# Patient Record
Sex: FEMALE | Race: WHITE
[De-identification: names, ages, dates, MRNs, and addresses within clinical notes are randomized per-mention and may not be internally consistent; named-entity substitution may affect disease eponyms.]

---

## 2019-04-10 ENCOUNTER — HOSPITAL ENCOUNTER (EMERGENCY)
Dept: HOSPITAL 5 - ED | Age: 39
Discharge: HOME | End: 2019-04-10
Payer: COMMERCIAL

## 2019-04-10 VITALS — SYSTOLIC BLOOD PRESSURE: 134 MMHG | DIASTOLIC BLOOD PRESSURE: 83 MMHG

## 2019-04-10 DIAGNOSIS — Z53.21: ICD-10-CM

## 2019-04-10 DIAGNOSIS — I10: Primary | ICD-10-CM

## 2019-04-10 LAB
ALBUMIN SERPL-MCNC: 4.3 G/DL (ref 3.9–5)
ALT SERPL-CCNC: 16 UNITS/L (ref 7–56)
BILIRUB UR QL STRIP: (no result)
BLOOD UR QL VISUAL: (no result)
BUN SERPL-MCNC: 11 MG/DL (ref 7–17)
BUN/CREAT SERPL: 16 %
CALCIUM SERPL-MCNC: 9.4 MG/DL (ref 8.4–10.2)
HCT VFR BLD CALC: 43 % (ref 30.3–42.9)
HEMOLYSIS INDEX: 0
HGB BLD-MCNC: 14.9 GM/DL (ref 10.1–14.3)
MCHC RBC AUTO-ENTMCNC: 35 % (ref 30–34)
MCV RBC AUTO: 89 FL (ref 79–97)
MUCOUS THREADS #/AREA URNS HPF: (no result) /HPF
PH UR STRIP: 5 [PH] (ref 5–7)
PLATELET # BLD: 252 K/MM3 (ref 140–440)
PROT UR STRIP-MCNC: (no result) MG/DL
RBC # BLD AUTO: 4.82 M/MM3 (ref 3.65–5.03)
RBC #/AREA URNS HPF: 2 /HPF (ref 0–6)
UROBILINOGEN UR-MCNC: < 2 MG/DL (ref ?–2)
WBC #/AREA URNS HPF: 3 /HPF (ref 0–6)

## 2019-04-10 PROCEDURE — 76705 ECHO EXAM OF ABDOMEN: CPT

## 2019-04-10 PROCEDURE — 71046 X-RAY EXAM CHEST 2 VIEWS: CPT

## 2019-04-10 PROCEDURE — 36415 COLL VENOUS BLD VENIPUNCTURE: CPT

## 2019-04-10 PROCEDURE — 85027 COMPLETE CBC AUTOMATED: CPT

## 2019-04-10 PROCEDURE — 81025 URINE PREGNANCY TEST: CPT

## 2019-04-10 PROCEDURE — 93005 ELECTROCARDIOGRAM TRACING: CPT

## 2019-04-10 PROCEDURE — 80053 COMPREHEN METABOLIC PANEL: CPT

## 2019-04-10 PROCEDURE — 81001 URINALYSIS AUTO W/SCOPE: CPT

## 2019-04-10 PROCEDURE — 84484 ASSAY OF TROPONIN QUANT: CPT

## 2019-04-10 PROCEDURE — 93010 ELECTROCARDIOGRAM REPORT: CPT

## 2019-04-10 PROCEDURE — 96374 THER/PROPH/DIAG INJ IV PUSH: CPT

## 2019-04-10 PROCEDURE — 99284 EMERGENCY DEPT VISIT MOD MDM: CPT

## 2019-04-10 PROCEDURE — 83690 ASSAY OF LIPASE: CPT

## 2019-04-10 NOTE — XRAY REPORT
PROCEDURE: XR CHEST ROUTINE 2V 

 

TECHNIQUE:  2 views of the chest 

 

HISTORY: chest pain and palpitations 

 

COMPARISONS: None  

 

FINDINGS: 

 

Normal heart size. 

 

Lungs are clear and well-expanded without focal infiltrate or consolidation. 

 

No effusion. 

 

Imaged axial skeleton is unremarkable. 

 

IMPRESSION:  

 

No acute cardiopulmonary disease.. 

 

This document is electronically signed by Angela Chua MD., April 10 2019 08:55:28 PM ET

## 2019-04-10 NOTE — ULTRASOUND REPORT
PROCEDURE: US ABDOMEN LIMITED 

 

TECHNIQUE:  Longitudinal and transverse grayscale, color, and Doppler sonographic images were perform
ed 

 

HISTORY: ruq pain with nausea 

 

COMPARISONS: None  

 

FINDINGS: 

 

The pancreas is not well visualized. 

 

Liver demonstrates diffuse increased echogenicity without focal lesion. 

 

Right kidney measures 10.0 cm, mildly echogenic but otherwise sonographically unremarkable. 

 

Inferior vena cava and hepatic veins and aorta are unremarkable. 

 

Gallbladder is anechoic. Wall measures 2 mm. No pericholecystic fluid. 

 

Common duct measures 4 mm. 

 

IMPRESSION:  

 

Mildly fatty liver and slightly echogenic kidneys. 

 

No intrahepatic biliary ductal dilatation. No gallstones.. 

 

This document is electronically signed by Angela Chua MD., April 10 2019 08:24:30 PM ET

## 2020-11-17 ENCOUNTER — HOSPITAL ENCOUNTER (EMERGENCY)
Dept: HOSPITAL 5 - ED | Age: 40
LOS: 1 days | Discharge: HOME | End: 2020-11-18
Payer: SELF-PAY

## 2020-11-17 VITALS — SYSTOLIC BLOOD PRESSURE: 119 MMHG | DIASTOLIC BLOOD PRESSURE: 71 MMHG

## 2020-11-17 DIAGNOSIS — R10.9: ICD-10-CM

## 2020-11-17 DIAGNOSIS — Z3A.14: ICD-10-CM

## 2020-11-17 DIAGNOSIS — O21.8: ICD-10-CM

## 2020-11-17 DIAGNOSIS — O26.891: Primary | ICD-10-CM

## 2020-11-17 DIAGNOSIS — Z79.899: ICD-10-CM

## 2020-11-17 LAB
BACTERIA #/AREA URNS HPF: (no result) /HPF
BASOPHILS # (AUTO): 0 K/MM3 (ref 0–0.1)
BASOPHILS NFR BLD AUTO: 0.3 % (ref 0–1.8)
BILIRUB UR QL STRIP: (no result)
BLOOD UR QL VISUAL: (no result)
EOSINOPHIL # BLD AUTO: 0.1 K/MM3 (ref 0–0.4)
EOSINOPHIL NFR BLD AUTO: 0.5 % (ref 0–4.3)
HCT VFR BLD CALC: 42.2 % (ref 30.3–42.9)
HGB BLD-MCNC: 14.7 GM/DL (ref 10.1–14.3)
LYMPHOCYTES # BLD AUTO: 1.6 K/MM3 (ref 1.2–5.4)
LYMPHOCYTES NFR BLD AUTO: 14 % (ref 13.4–35)
MCHC RBC AUTO-ENTMCNC: 35 % (ref 30–34)
MCV RBC AUTO: 91 FL (ref 79–97)
MONOCYTES # (AUTO): 0.5 K/MM3 (ref 0–0.8)
MONOCYTES % (AUTO): 4.2 % (ref 0–7.3)
MUCOUS THREADS #/AREA URNS HPF: (no result) /HPF
PH UR STRIP: 7 [PH] (ref 5–7)
PLATELET # BLD: 207 K/MM3 (ref 140–440)
PROT UR STRIP-MCNC: (no result) MG/DL
RBC # BLD AUTO: 4.64 M/MM3 (ref 3.65–5.03)
RBC #/AREA URNS HPF: 1 /HPF (ref 0–6)
UROBILINOGEN UR-MCNC: < 2 MG/DL (ref ?–2)
WBC #/AREA URNS HPF: 1 /HPF (ref 0–6)

## 2020-11-17 PROCEDURE — 85025 COMPLETE CBC W/AUTO DIFF WBC: CPT

## 2020-11-17 PROCEDURE — 99284 EMERGENCY DEPT VISIT MOD MDM: CPT

## 2020-11-17 PROCEDURE — 84702 CHORIONIC GONADOTROPIN TEST: CPT

## 2020-11-17 PROCEDURE — 84703 CHORIONIC GONADOTROPIN ASSAY: CPT

## 2020-11-17 PROCEDURE — 96375 TX/PRO/DX INJ NEW DRUG ADDON: CPT

## 2020-11-17 PROCEDURE — 85379 FIBRIN DEGRADATION QUANT: CPT

## 2020-11-17 PROCEDURE — 36415 COLL VENOUS BLD VENIPUNCTURE: CPT

## 2020-11-17 PROCEDURE — 80053 COMPREHEN METABOLIC PANEL: CPT

## 2020-11-17 PROCEDURE — 71046 X-RAY EXAM CHEST 2 VIEWS: CPT

## 2020-11-17 PROCEDURE — 76801 OB US < 14 WKS SINGLE FETUS: CPT

## 2020-11-17 PROCEDURE — 96361 HYDRATE IV INFUSION ADD-ON: CPT

## 2020-11-17 PROCEDURE — 76705 ECHO EXAM OF ABDOMEN: CPT

## 2020-11-17 PROCEDURE — 71275 CT ANGIOGRAPHY CHEST: CPT

## 2020-11-17 PROCEDURE — 96374 THER/PROPH/DIAG INJ IV PUSH: CPT

## 2020-11-17 PROCEDURE — 81001 URINALYSIS AUTO W/SCOPE: CPT

## 2020-11-18 LAB
ALBUMIN SERPL-MCNC: 4.1 G/DL (ref 3.9–5)
ALT SERPL-CCNC: 100 UNITS/L (ref 7–56)
BUN SERPL-MCNC: 9 MG/DL (ref 7–17)
BUN/CREAT SERPL: 15 %
CALCIUM SERPL-MCNC: 9.4 MG/DL (ref 8.4–10.2)
HEMOLYSIS INDEX: 8

## 2020-11-18 NOTE — ULTRASOUND REPORT
ULTRASOUND OBSTETRIC 



INDICATION / CLINICAL INFORMATION:

Pelvic pain.



TECHNIQUE:

Transabdominal.



COMPARISON:

None available.



FINDINGS:

GESTATIONAL SAC: Well-defined oval shape and intrauterine in location. 

YOLK SAC: No significant abnormality.



EMBRYO/FETUS: No significant abnormality. 

- Crown-Rump Length = 1.46 cm = 7.6 weeks.days

- Fetal Heart Rate, beats per minute (if present) = 166



ADNEXA: No significant abnormality.

FREE FLUID: None.



ADDITIONAL FINDINGS: None.



IMPRESSION:

1. Single, living intrauterine pregnancy with estimated sonographic age of 7.6 weeks.days.



Signer Name: Jonathan Shelley MD 

Signed: 11/18/2020 4:09 AM

Workstation Name: [email or web address]

## 2020-11-18 NOTE — CAT SCAN REPORT
CTA CHEST WITH CONTRAST



INDICATION / CLINICAL INFORMATION: RUQ pain w/ elevated d-dimmer.



TECHNIQUE: Axial CT images were obtained through the chest after injection of 100 cc of Omnipaque 350
 IV contrast. 3 plane MIP and/or 3D reconstructions were produced. All CT scans at this location are 
performed using CT dose reduction for ALARA by means of automated exposure control. 



COMPARISON: None available.



FINDINGS:

PULMONARY ARTERIES: No pulmonary emboli.

THORACIC AORTA: No significant abnormality. 

HEART: No significant abnormality.

CORONARY ARTERY CALCIFICATION: None identified

MEDIASTINUM / HELENE: No significant abnormality.

PLEURA: No pleural effusion. No pneumothorax.

LUNGS: 2 



ADDITIONAL FINDINGS: None.



UPPER ABDOMEN: No acute findings.



SKELETAL STRUCTURES: No significant osseous abnormality.



IMPRESSION:

1. No CT evidence for pulmonary embolism. 

2. No acute findings.



Signer Name: Viral Kay MD 

Signed: 11/18/2020 11:09 AM

Workstation Name: [id number]

## 2020-11-18 NOTE — XRAY REPORT
CHEST 2 VIEWS 



INDICATION / CLINICAL INFORMATION:

RUQ pain.



COMPARISON: 

4/10/2019



FINDINGS:



SUPPORT DEVICES: None.

HEART / MEDIASTINUM: No significant abnormality. 

LUNGS / PLEURA: No significant pulmonary or pleural abnormality. No pneumothorax. 



ADDITIONAL FINDINGS: No significant additional findings.



IMPRESSION:

No significant abnormality or interval change from 4/10/2019



Signer Name: Rich Rodgers MD FACR 

Signed: 11/18/2020 8:38 AM

Workstation Name: [id number]

## 2020-11-18 NOTE — EMERGENCY DEPARTMENT REPORT
ED Abdominal Pain HPI





- General


Chief Complaint: Abdominal Pain


Stated Complaint: ABDOMINAL PAIN


Time Seen by Provider: 20 07:25


Source: patient


Mode of arrival: Ambulatory


Limitations: No Limitations





- History of Present Illness


Initial Comments: 





This is a 40-year-old female nontoxic, well nourished in appearance, no acute 

signs of distress presents to the ED with c/o of nausea and right upper 

abdominal pain with radiation to right flank x1 day.   Patient denies any 

vomiting.  Patient stated is 8 weeks pregnant.  Patient stated pain worse with 

deep breathes.  Describes pain as sharp with sudden onset with level of 8/10.  

Patient denies chest pain, short of breath, fever, hemoptysis, blood in stool, 

chills, headache, stiff neck, numbness or tingling.  Patient denies any diarrhea

or constipation.  Denies any blood in stool.  Patient denies any recent travels.

 Denies any vaginal bleeding.  Denies any allergies.





Hannah  has been present during the full physical exam and ED 

stay.


MD Complaint: abdominal pain


-: days(s)


Location: RUQ


Radiation: R flank


Migration to: no migration


Severity: moderate


Severity scale (0 -10): 8


Quality: cramping, aching, sharp


Consistency: constant


Improves With: nothing


Worsens With: nothing


Associated Symptoms: nausea.  denies: vomiting, diarrhea, fever, chills, 

constipation, dysuria, hematemesis, hematochezia, melena, hematuria, anorexia, 

syncope





- Related Data


                                  Previous Rx's











 Medication  Instructions  Recorded  Last Taken  Type


 


amLODIPine 5 mg PO DAILY #30 tab 04/10/19 Unknown Rx











                                    Allergies











Allergy/AdvReac Type Severity Reaction Status Date / Time


 


No Known Allergies Allergy   Verified 04/10/19 16:19














ED Review of Systems


ROS: 


Stated complaint: ABDOMINAL PAIN


Other details as noted in HPI





Comment: All other systems reviewed and negative


Constitutional: denies: chills, fever


Eyes: denies: eye pain, eye discharge, vision change


ENT: denies: ear pain, throat pain


Respiratory: denies: cough, shortness of breath, wheezing


Cardiovascular: denies: chest pain, palpitations


Endocrine: no symptoms reported


Gastrointestinal: abdominal pain, nausea.  denies: vomiting, diarrhea, 

constipation, hematemesis, melena, hematochezia


Genitourinary: denies: urgency, dysuria, frequency, hematuria, discharge, 

abnormal menses, dyspareunia


Musculoskeletal: denies: back pain, joint swelling, arthralgia


Skin: denies: rash, lesions


Neurological: denies: headache, weakness, paresthesias


Psychiatric: denies: anxiety, depression


Hematological/Lymphatic: denies: easy bleeding, easy bruising





ED Past Medical Hx





- Past Medical History


Hx Hypertension: No


Hx Congestive Heart Failure: No


Hx Diabetes: No


Hx Deep Vein Thrombosis: No


Hx Renal Disease: No


Hx Sickle Cell Disease: No


Hx Seizures: No


Hx Asthma: No


Hx COPD: No


Hx HIV: No





- Social History


Smoking Status: Never Smoker


Substance Use Type: None





- Medications


Home Medications: 


                                Home Medications











 Medication  Instructions  Recorded  Confirmed  Last Taken  Type


 


amLODIPine 5 mg PO DAILY #30 tab 04/10/19  Unknown Rx














ED Physical Exam





- General


Limitations: No Limitations


General appearance: alert, in no apparent distress





- Head


Head exam: Present: atraumatic, normocephalic





- Eye


Eye exam: Present: normal appearance





- Neck


Neck exam: Present: normal inspection, full ROM.  Absent: tenderness, 

meningismus, lymphadenopathy





- Respiratory


Respiratory exam: Present: normal lung sounds bilaterally.  Absent: respiratory 

distress, wheezes, rales, rhonchi, chest wall tenderness, accessory muscle use, 

decreased breath sounds, prolonged expiratory





- Cardiovascular


Cardiovascular Exam: Present: regular rate, normal rhythm, normal heart sounds. 

Absent: bradycardia, tachycardia, irregular rhythm, systolic murmur, diastolic 

murmur, rubs, gallop





- GI/Abdominal


GI/Abdominal exam: Present: soft, tenderness (RUQ), normal bowel sounds.  

Absent: distended, guarding, rebound, rigid, diminished bowel sounds





- Extremities Exam


Extremities exam: Present: normal inspection, full ROM





- Back Exam


Back exam: Present: normal inspection, full ROM.  Absent: tenderness, CVA ten

derness (R), CVA tenderness (L), muscle spasm, paraspinal tenderness, vertebral 

tenderness, rash noted





- Neurological Exam


Neurological exam: Present: alert, oriented X3, normal gait





- Psychiatric


Psychiatric exam: Present: normal affect, normal mood





- Skin


Skin exam: Present: warm, dry, intact, normal color.  Absent: rash





ED Course


                                   Vital Signs











  20





  22:08


 


Temperature 97.6 F


 


Pulse Rate 82


 


Respiratory 20





Rate 


 


Blood Pressure 119/71


 


O2 Sat by Pulse 100





Oximetry 














- Reevaluation(s)


Reevaluation #1: 





20 08:41


Patient is speaking in full sentences with no signs of distress noted.





- Consultations


Consultation #1: 





20 08:41


Patient has been consulted with GREG Otoole about patient history, physical 

exam, and labs/imaging studies and agrees to the ED plan of care.


Consultation #2: 





20 09:26


Patient consulted with Dr. Chu of lab results and CT angio ordered.


Consultation #3: 





20 11:59


Patient has been consulted with GREG Otoole about patient history, physical 

exam, and labs/imagining results and patient can be discharged with follow-up.





ED Medical Decision Making





- Lab Data


Result diagrams: 


                                 20 22:32





                                 20 22:32








                                   Lab Results











  20 Range/Units





  22:32 22:32 22:32 


 


WBC   11.4 H   (4.5-11.0)  K/mm3


 


RBC   4.64   (3.65-5.03)  M/mm3


 


Hgb   14.7 H   (10.1-14.3)  gm/dl


 


Hct   42.2   (30.3-42.9)  %


 


MCV   91   (79-97)  fl


 


MCH   32   (28-32)  pg


 


MCHC   35 H   (30-34)  %


 


RDW   13.3   (13.2-15.2)  %


 


Plt Count   207   (140-440)  K/mm3


 


Lymph % (Auto)   14.0   (13.4-35.0)  %


 


Mono % (Auto)   4.2   (0.0-7.3)  %


 


Eos % (Auto)   0.5   (0.0-4.3)  %


 


Baso % (Auto)   0.3   (0.0-1.8)  %


 


Lymph # (Auto)   1.6   (1.2-5.4)  K/mm3


 


Mono # (Auto)   0.5   (0.0-0.8)  K/mm3


 


Eos # (Auto)   0.1   (0.0-0.4)  K/mm3


 


Baso # (Auto)   0.0   (0.0-0.1)  K/mm3


 


Seg Neutrophils %   81.0 H   (40.0-70.0)  %


 


Seg Neutrophils #   9.2 H   (1.8-7.7)  K/mm3


 


D-Dimer     (0-234)  ng/mlDDU


 


Sodium    135 L  (137-145)  mmol/L


 


Potassium    3.6  (3.6-5.0)  mmol/L


 


Chloride    100.2  ()  mmol/L


 


Carbon Dioxide    25  (22-30)  mmol/L


 


Anion Gap    13  mmol/L


 


BUN    9  (7-17)  mg/dL


 


Creatinine    0.6  (0.6-1.2)  mg/dL


 


Estimated GFR    > 60  ml/min


 


BUN/Creatinine Ratio    15  %


 


Glucose    112 H  ()  mg/dL


 


Calcium    9.4  (8.4-10.2)  mg/dL


 


Total Bilirubin    0.70  (0.1-1.2)  mg/dL


 


AST    162 H  (5-40)  units/L


 


ALT    100 H  (7-56)  units/L


 


Alkaline Phosphatase    85  ()  units/L


 


Total Protein    7.0  (6.3-8.2)  g/dL


 


Albumin    4.1  (3.9-5)  g/dL


 


Albumin/Globulin Ratio    1.4  %


 


HCG, Qual     (Negative)  


 


HCG, Quant  69103 H    (0-4)  mIU/mL


 


Urine Color     (Yellow)  


 


Urine Turbidity     (Clear)  


 


Urine pH     (5.0-7.0)  


 


Ur Specific Gravity     (1.003-1.030)  


 


Urine Protein     (Negative)  mg/dL


 


Urine Glucose (UA)     (Negative)  mg/dL


 


Urine Ketones     (Negative)  mg/dL


 


Urine Blood     (Negative)  


 


Urine Nitrite     (Negative)  


 


Urine Bilirubin     (Negative)  


 


Urine Urobilinogen     (<2.0)  mg/dL


 


Ur Leukocyte Esterase     (Negative)  


 


Urine WBC (Auto)     (0.0-6.0)  /HPF


 


Urine RBC (Auto)     (0.0-6.0)  /HPF


 


U Epithel Cells (Auto)     (0-13.0)  /HPF


 


Urine Bacteria (Auto)     (Negative)  /HPF


 


Urine Mucus     /HPF














  20 Range/Units





  22:32 22:51 08:30 


 


WBC     (4.5-11.0)  K/mm3


 


RBC     (3.65-5.03)  M/mm3


 


Hgb     (10.1-14.3)  gm/dl


 


Hct     (30.3-42.9)  %


 


MCV     (79-97)  fl


 


MCH     (28-32)  pg


 


MCHC     (30-34)  %


 


RDW     (13.2-15.2)  %


 


Plt Count     (140-440)  K/mm3


 


Lymph % (Auto)     (13.4-35.0)  %


 


Mono % (Auto)     (0.0-7.3)  %


 


Eos % (Auto)     (0.0-4.3)  %


 


Baso % (Auto)     (0.0-1.8)  %


 


Lymph # (Auto)     (1.2-5.4)  K/mm3


 


Mono # (Auto)     (0.0-0.8)  K/mm3


 


Eos # (Auto)     (0.0-0.4)  K/mm3


 


Baso # (Auto)     (0.0-0.1)  K/mm3


 


Seg Neutrophils %     (40.0-70.0)  %


 


Seg Neutrophils #     (1.8-7.7)  K/mm3


 


D-Dimer    819.12 H  (0-234)  ng/mlDDU


 


Sodium     (137-145)  mmol/L


 


Potassium     (3.6-5.0)  mmol/L


 


Chloride     ()  mmol/L


 


Carbon Dioxide     (22-30)  mmol/L


 


Anion Gap     mmol/L


 


BUN     (7-17)  mg/dL


 


Creatinine     (0.6-1.2)  mg/dL


 


Estimated GFR     ml/min


 


BUN/Creatinine Ratio     %


 


Glucose     ()  mg/dL


 


Calcium     (8.4-10.2)  mg/dL


 


Total Bilirubin     (0.1-1.2)  mg/dL


 


AST     (5-40)  units/L


 


ALT     (7-56)  units/L


 


Alkaline Phosphatase     ()  units/L


 


Total Protein     (6.3-8.2)  g/dL


 


Albumin     (3.9-5)  g/dL


 


Albumin/Globulin Ratio     %


 


HCG, Qual  Positive    (Negative)  


 


HCG, Quant     (0-4)  mIU/mL


 


Urine Color   Yellow   (Yellow)  


 


Urine Turbidity   Clear   (Clear)  


 


Urine pH   7.0   (5.0-7.0)  


 


Ur Specific Gravity   1.014   (1.003-1.030)  


 


Urine Protein   <15 mg/dl   (Negative)  mg/dL


 


Urine Glucose (UA)   Neg   (Negative)  mg/dL


 


Urine Ketones   Tr   (Negative)  mg/dL


 


Urine Blood   Neg   (Negative)  


 


Urine Nitrite   Neg   (Negative)  


 


Urine Bilirubin   Neg   (Negative)  


 


Urine Urobilinogen   < 2.0   (<2.0)  mg/dL


 


Ur Leukocyte Esterase   Neg   (Negative)  


 


Urine WBC (Auto)   1.0   (0.0-6.0)  /HPF


 


Urine RBC (Auto)   1.0   (0.0-6.0)  /HPF


 


U Epithel Cells (Auto)   2.0   (0-13.0)  /HPF


 


Urine Bacteria (Auto)   1+   (Negative)  /HPF


 


Urine Mucus   Few   /HPF














- Radiology Data











Referring Physician: RICH CONNOR


 


Patient Name: DENNIS SCOTT


 


Patient ID: [id number]


 


YOB: 1980


 


Sex: Female


 


Accession: [id number]


 


Report Date: 2020


 


Report Status: Finalized








[facility], [place] [address] [place] [address] Cat

Scan Report Signed Patient: DENNIS SCOTT MR#: [id number] : 

1980 Acct:[id number] Age/Sex: 40 / F ADM Date: 20 Loc: ED 

Attending Dr: Ordering Physician: RICH CONNOR NP Date of Service: 20 

Procedure(s): CT angio chest Accession Number(s): [id number] cc: RICH CONNOR NP 

CTA CHEST WITH CONTRAST INDICATION / CLINICAL INFORMATION: RUQ pain w/ elevated 

d-dimmer. TECHNIQUE: Axial CT images were obtained through the chest after 

injection of 100 cc of Omnipaque 350 IV contrast. 3 plane MIP and/or 3D 

reconstructions were produced. All CT scans at this location are performed using

CT dose reduction for ALARA by means of automated exposure control. COMPARISON: 

None available. FINDINGS: PULMONARY ARTERIES: No pulmonary emboli. THORACIC 

AORTA: No significant abnormality. HEART: No significant abnormality. CORONARY 

ARTERY CALCIFICATION: None identified MEDIASTINUM / HELENE: No significant 

abnormality. PLEURA: No pleural effusion. No pneumothorax. LUNGS: 2 ADDITIONAL 

FINDINGS: None. UPPER ABDOMEN: No acute findings. SKELETAL STRUCTURES: No 

significant osseous abnormality. IMPRESSION: 1. No CT evidence for pulmonary 

embolism. 2. No acute findings. Signer Name: Viral Kay MD Signed: 2020

11:09 AM Workstation Name: [id number] Transcribed By: SS Dictated By: Viral Kay MD Electronically Authenticated By: Viral Kay MD Signed 

Date/Time: 209 DD/DT: 20 TD/TT: 











Referring Physician: RICH CONNOR


 


Patient Name: DENNIS SCOTT


 


Patient ID: [id number]


 


YOB: 1980


 


Sex: Female


 


Accession: [id number]


 


Report Date: 2020


 


Report Status: Finalized








[address] [address] 

XRay Report Signed Patient: DENNIS SCOTT MR#: [id number] : 

1980 Acct:[id number] Age/Sex: 40 / F ADM Date: 20 Loc: ED 

Attending Dr: Ordering Physician: RICH CONNOR NP Date of Service: 20 

Procedure(s): XR chest routine 2V Accession Number(s): [id number] cc: RICH CONNOR NP Fluoro Time In Minutes: CHEST 2 VIEWS INDICATION / CLINICAL 

INFORMATION: RUQ pain. COMPARISON: 4/10/2019 FINDINGS: SUPPORT DEVICES: None. 

HEART / MEDIASTINUM: No significant abnormality. LUNGS / PLEURA: No significant 

pulmonary or pleural abnormality. No pneumothorax. ADDITIONAL FINDINGS: No 

significant additional findings. IMPRESSION: No significant abnormality or 

interval change from 4/10/2019 Signer Name: Rich Rodgers MD FACR Signed: 

2020 8:38 AM Workstation Name: [id number] Transcribed By: MS Dictated By:

Rich Rodgers MD Electronically Authenticated By: Rich Rodgers MD 

Signed Date/Time: 20 DD/DT: 20 TD/TT: 











Referring Physician: DANIELLE WANG


 


Patient Name: DENNIS SCOTT


 


Patient ID: [id number]


 


YOB: 1980


 


Sex: Female


 


Accession: [id number]


 


Report Date: 2020


 


Report Status: Finalized








[address] [address] 

Ultrasound Report Signed Patient: DENNIS SCOTT MR#: [id number] : 

1980 Acct:[id number] Age/Sex: 40 / F ADM Date: 20 Loc: ED 

Attending Dr: Ordering Physician: DANIELLE WANG NP Date of Service: 

20 Procedure(s): US abdomen limited Accession Number(s): [id number] cc: 

DANIELLE WANG NP LIMITED RUQ ABDOMINAL ULTRASOUND INDICATION: RUQ Pain. 

COMPARISON: No relevant prior imaging study available. FINDINGS: Pancreas: 

Visualized portions show no significant abnormality. Abdominal Aorta: No 

significant abnormality. IVC: No significant abnormality. Liver: The liver 

measures 16 cm in length. No significant abnormality. Normal hepatopedal blood 

flow in the main portal vein. Gallbladder: No significant abnormality. Bile 

ducts: No significant abnormality. Common bile duct measures 3.5 mm. Right 

kidney: No significant abnormality visualized.. Free fluid: None. Additional 

Findings: None. IMPRESSION: 1. No acute findings.. Signer Name: Jonathan Shelley MD 

Signed: 2020 6:38 AM Workstation Name: [id number] Transcribed By: YURY 

Dictated By: Jonathan Shelley MD Electronically Authenticated By: Jonathan Shelley MD Signed Date/Time: 20 DD/DT: 20 TD/TT: 











Referring Physician: DOLORES DUMONT


 


Patient Name: DENNIS SCOTT


 


Patient ID: [id number]


 


YOB: 1980


 


Sex: Female


 


Accession: [id number]


 


Report Date: 2020


 


Report Status: Finalized








[place] [address] 

Ultrasound Report Signed Patient: DENNIS SCOTT MR#: [id number] : 

1980 Acct:[id number] Age/Sex: 40 / F ADM Date: 20 Loc: ED 

Attending Dr: Ordering Physician: DOLORES DUMONT MD Date of Service: 20 

Procedure(s): US OB <= 14 weeks fetus Accession Number(s): [id number] cc: DOLORES DUMONT MD ULTRASOUND OBSTETRIC INDICATION / CLINICAL INFORMATION: Pelvic pain. 

TECHNIQUE: Transabdominal. COMPARISON: None available. FINDINGS: GESTATIONAL 

SAC: Well-defined oval shape and intrauterine in location. YOLK SAC: No 

significant abnormality. EMBRYO/FETUS: No significant abnormality. - Crown-Rump 

Length = 1.46 cm = 7.6 weeks.days - Fetal Heart Rate, beats per minute (if 

present) = 166 ADNEXA: No significant abnormality. FREE FLUID: None. ADDITIONAL 

FINDINGS: None. IMPRESSION: 1. Single, living intrauterine pregnancy with 

estimated sonographic age of 7.6 weeks.days. Signer Name: Jonathan Shelley MD 

Signed: 2020 4:09 AM Workstation Name: [id number] Transcribed By: YURY 

Dictated By: Jonathan Shelley MD Electronically Authenticated By: Jonathan Shelley MD Signed Date/Time: 20 DD/DT: 20 TD/TT: 





- Medical Decision Making





This is a 40-year-old female that presents with abdominal pain.  Patient is 

stable and was examined by me. Negative signs of symptoms of appendicitis.  Labs

obtained. UA obtained. Imaging studies obtained and dictated by the radiologist.

Patient is notified of the report with no questions noted by the patient. Vital 

signs are stable prior to discharge.  Patient received medical treatment in the 

ED which patient stated symptoms has resovled and subsided.   present at 

bedside and stated will drive patient home after discharge. A by mouth challenge

has been obtained and patient tolerated well with no nausea vomiting. Patient 

was also instructed to Follow-up with a OB/GYN and gastroenterologist doctor in 

3-5 days or if symptoms worsen and continue return to emergency room as soon as 

possible.  At time of discharge, the patient does not seem toxic or ill in 

appearance.  No acute signs of distress noted.  Patient agrees to discharge 

treatment plan of care.  No further questions noted by the patient.





Hannah Ethiopian translation has been present during the whole ED stay as well as 

discharge instructions.





- Differential Diagnosis


PE, cholecystitis, cholelithiasis, gastritis, GERD


Critical care attestation.: 


If time is entered above; I have spent that time in minutes in the direct care 

of this critically ill patient, excluding procedure time.








ED Disposition


Clinical Impression: 


 Abdominal pain affecting pregnancy, antepartum





Abdominal pain


Qualifiers:


 Abdominal location: right upper quadrant Qualified Code(s): R10.11 - Right 

upper quadrant pain





Nausea & vomiting


Qualifiers:


 Vomiting type: unspecified Vomiting Intractability: non-intractable Qualified 

Code(s): R11.2 - Nausea with vomiting, unspecified





Disposition: -01 TO HOME OR SELFCARE


Is pt being admited?: No


Does the pt Need Aspirin: No


Condition: Stable


Instructions:  Abdominal Pain (ED), Abdominal Pain, Adult, Nausea and Vomiting, 

Adult, Abdominal Pain During Pregnancy


Additional Instructions: 


Follow-up with a OB/GYN and gastroenterologist doctor in 3-5 days or if symptoms

worsen and continue return to emergency room as soon as possible. 


Referrals: 


[facility] [Primary Care Provider] - 3-5 Days


[facility] [Provider Group] - 3-5 Days


[facility] [Provider Group] - 3-5 Days


[facility] [Provider Group] - 3-5 Days


Print Language: Polish

## 2020-11-18 NOTE — ULTRASOUND REPORT
LIMITED RUQ ABDOMINAL ULTRASOUND 



INDICATION:

RUQ Pain.



COMPARISON:

No relevant prior imaging study available. 



FINDINGS:

Pancreas: Visualized portions show no significant abnormality.

Abdominal Aorta: No significant abnormality. IVC: No significant abnormality.

Liver: The liver measures 16 cm in length. No significant abnormality. Normal hepatopedal blood flow 
in the main portal vein.

Gallbladder: No significant abnormality. 

Bile ducts: No significant abnormality. Common bile duct measures 3.5 mm. 

Right kidney: No significant abnormality visualized.. 



Free fluid: None.

Additional Findings: None.



IMPRESSION:

1. No acute findings..



Signer Name: Jonathan Shelley MD 

Signed: 11/18/2020 6:38 AM

Workstation Name: [id number]